# Patient Record
Sex: FEMALE | Race: WHITE | NOT HISPANIC OR LATINO | Employment: FULL TIME | RURAL
[De-identification: names, ages, dates, MRNs, and addresses within clinical notes are randomized per-mention and may not be internally consistent; named-entity substitution may affect disease eponyms.]

---

## 2023-04-03 ENCOUNTER — OFFICE VISIT (OUTPATIENT)
Dept: PRIMARY CARE CLINIC | Facility: CLINIC | Age: 28
End: 2023-04-03

## 2023-04-03 VITALS
TEMPERATURE: 98 F | HEART RATE: 120 BPM | OXYGEN SATURATION: 100 % | SYSTOLIC BLOOD PRESSURE: 123 MMHG | HEIGHT: 64 IN | DIASTOLIC BLOOD PRESSURE: 82 MMHG | BODY MASS INDEX: 38.07 KG/M2 | RESPIRATION RATE: 18 BRPM | WEIGHT: 223 LBS

## 2023-04-03 DIAGNOSIS — J06.9 URI WITH COUGH AND CONGESTION: Primary | ICD-10-CM

## 2023-04-03 LAB
CTP QC/QA: YES
FLUAV AG NPH QL: NEGATIVE
FLUBV AG NPH QL: NEGATIVE

## 2023-04-03 PROCEDURE — 96372 THER/PROPH/DIAG INJ SC/IM: CPT | Mod: ,,, | Performed by: STUDENT IN AN ORGANIZED HEALTH CARE EDUCATION/TRAINING PROGRAM

## 2023-04-03 PROCEDURE — 96372 PR INJECTION,THERAP/PROPH/DIAG2ST, IM OR SUBCUT: ICD-10-PCS | Mod: ,,, | Performed by: STUDENT IN AN ORGANIZED HEALTH CARE EDUCATION/TRAINING PROGRAM

## 2023-04-03 PROCEDURE — 87804 POCT INFLUENZA A/B: ICD-10-PCS | Mod: QW,,, | Performed by: STUDENT IN AN ORGANIZED HEALTH CARE EDUCATION/TRAINING PROGRAM

## 2023-04-03 PROCEDURE — 87804 INFLUENZA ASSAY W/OPTIC: CPT | Mod: QW,,, | Performed by: STUDENT IN AN ORGANIZED HEALTH CARE EDUCATION/TRAINING PROGRAM

## 2023-04-03 PROCEDURE — 99203 OFFICE O/P NEW LOW 30 MIN: CPT | Mod: 25,,, | Performed by: STUDENT IN AN ORGANIZED HEALTH CARE EDUCATION/TRAINING PROGRAM

## 2023-04-03 PROCEDURE — 99203 PR OFFICE/OUTPT VISIT, NEW, LEVL III, 30-44 MIN: ICD-10-PCS | Mod: 25,,, | Performed by: STUDENT IN AN ORGANIZED HEALTH CARE EDUCATION/TRAINING PROGRAM

## 2023-04-03 RX ORDER — AZITHROMYCIN 500 MG/1
500 TABLET, FILM COATED ORAL DAILY
Qty: 5 TABLET | Refills: 0 | Status: SHIPPED | OUTPATIENT
Start: 2023-04-03 | End: 2023-04-08

## 2023-04-03 RX ORDER — OSELTAMIVIR PHOSPHATE 75 MG/1
75 CAPSULE ORAL 2 TIMES DAILY
Qty: 10 CAPSULE | Refills: 0 | Status: CANCELLED | OUTPATIENT
Start: 2023-04-03 | End: 2023-04-08

## 2023-04-03 RX ORDER — DEXAMETHASONE SODIUM PHOSPHATE 4 MG/ML
4 INJECTION, SOLUTION INTRA-ARTICULAR; INTRALESIONAL; INTRAMUSCULAR; INTRAVENOUS; SOFT TISSUE
Status: COMPLETED | OUTPATIENT
Start: 2023-04-03 | End: 2023-04-03

## 2023-04-03 RX ADMIN — DEXAMETHASONE SODIUM PHOSPHATE 4 MG: 4 INJECTION, SOLUTION INTRA-ARTICULAR; INTRALESIONAL; INTRAMUSCULAR; INTRAVENOUS; SOFT TISSUE at 04:04

## 2023-04-03 NOTE — LETTER
April 3, 2023      [facility]  [address] [facility] [place] [address]  Phone: [phone]  Fax: [phone]       Patient: Kalyn Muñiz   YOB: 1995  Date of Visit: 04/03/2023    To Whom It May Concern:      Audrey Muñiz  was at [facility] on 04/03/2023. The patient may return to work/school on 4/4/23 with no restrictions. If you have any questions or concerns, or if I can be of further assistance, please do not hesitate to contact me.        Sincerely,    Gwyn Self MD

## 2023-04-03 NOTE — PROGRESS NOTES
Subjective:      Kalyn Muñiz is a 28 y.o.female who presents to clinic for Fever (X 2 days), Sore Throat, Generalized Body Aches, Sinus Problem, and Cough    Symptoms began 2 days ago and include: fever (Tm 101F), sore throat, myalgias, cough, rhinorrhea, sinus congestion. Denies shortness of breath. She hasn't taken anything for her symptoms. Son has been sick with similar symptoms.       Past Medical History:  has no past medical history on file.   Past Surgical History:  has a past surgical history that includes Tonsillectomy; Cholecystectomy; and Knee arthrodesis (Left, 01/2010).  Family History: family history includes Arthritis in her mother; Cancer in her father; Depression in her father; Diabetes in her father; Hypertension in her father.  Social History:  reports that she has been smoking cigarettes. She has a 5.00 pack-year smoking history. She has never used smokeless tobacco. She reports that she does not drink alcohol and does not use drugs.  Allergies:   Review of patient's allergies indicates:   Allergen Reactions    Penicillin Hives, Shortness Of Breath and Swelling       Objective:     Vitals:    04/03/23 1541   BP: 123/82   Pulse: (!) 120   Resp: 18   Temp: 98.3 °F (36.8 °C)     Physical Exam  Vitals reviewed.   Constitutional:       General: She is not in acute distress.     Appearance: Normal appearance. She is ill-appearing. She is not toxic-appearing or diaphoretic.   HENT:      Head: Normocephalic and atraumatic.      Right Ear: Tympanic membrane, ear canal and external ear normal.      Left Ear: Tympanic membrane, ear canal and external ear normal.      Nose: Nose normal.      Right Sinus: No maxillary sinus tenderness or frontal sinus tenderness.      Left Sinus: No maxillary sinus tenderness or frontal sinus tenderness.      Mouth/Throat:      Mouth: Mucous membranes are moist.      Pharynx: Posterior oropharyngeal erythema present. No oropharyngeal exudate.      Tonsils: No tonsillar  exudate. 2+ on the right. 2+ on the left.   Eyes:      General: No scleral icterus.        Right eye: No discharge.         Left eye: No discharge.   Cardiovascular:      Rate and Rhythm: Regular rhythm. Tachycardia present.      Pulses: Normal pulses.      Heart sounds: Normal heart sounds. No murmur heard.  Pulmonary:      Effort: Pulmonary effort is normal. No respiratory distress.      Breath sounds: Normal breath sounds. No wheezing, rhonchi or rales.   Lymphadenopathy:      Cervical: Cervical adenopathy present.   Skin:     General: Skin is warm.   Neurological:      General: No focal deficit present.      Mental Status: She is alert.   Psychiatric:         Behavior: Behavior normal.          Assessment/Plan:     1. URI with cough and congestion  - flu negative  - due to PCN allergy, treatment with azithromycin  - POCT Influenza A/B Rapid Antigen  - dexAMETHasone injection 4 mg  - brompheniramin-phenylephrin-DM 1-2.5-5 mg/5 mL Soln; Take 10 mLs by mouth every 4 (four) hours as needed (cough and congestion).  Dispense: 118 mL; Refill: 0  - azithromycin (ZITHROMAX) 500 MG tablet; Take 1 tablet (500 mg total) by mouth once daily. for 5 days  Dispense: 5 tablet; Refill: 0        Return to clinic if symptoms persist/worsen and as needed.     Gwyn Self MD  Family Medicine  04/03/2023